# Patient Record
Sex: FEMALE | URBAN - METROPOLITAN AREA
[De-identification: names, ages, dates, MRNs, and addresses within clinical notes are randomized per-mention and may not be internally consistent; named-entity substitution may affect disease eponyms.]

---

## 2022-09-22 PROBLEM — Z00.00 ENCOUNTER FOR PREVENTIVE HEALTH EXAMINATION: Status: ACTIVE | Noted: 2022-09-22

## 2022-09-27 ENCOUNTER — EMERGENCY (EMERGENCY)
Facility: HOSPITAL | Age: 87
LOS: 0 days | Discharge: HOME | End: 2022-09-27
Attending: STUDENT IN AN ORGANIZED HEALTH CARE EDUCATION/TRAINING PROGRAM

## 2022-09-27 VITALS — TEMPERATURE: 98 F | WEIGHT: 123.9 LBS

## 2022-09-27 DIAGNOSIS — Z20.822 CONTACT WITH AND (SUSPECTED) EXPOSURE TO COVID-19: ICD-10-CM

## 2022-09-27 DIAGNOSIS — H91.90 UNSPECIFIED HEARING LOSS, UNSPECIFIED EAR: ICD-10-CM

## 2022-09-27 DIAGNOSIS — I46.9 CARDIAC ARREST, CAUSE UNSPECIFIED: ICD-10-CM

## 2022-09-27 DIAGNOSIS — I10 ESSENTIAL (PRIMARY) HYPERTENSION: ICD-10-CM

## 2022-09-27 DIAGNOSIS — M19.90 UNSPECIFIED OSTEOARTHRITIS, UNSPECIFIED SITE: ICD-10-CM

## 2022-09-27 DIAGNOSIS — Z66 DO NOT RESUSCITATE: ICD-10-CM

## 2022-09-27 DIAGNOSIS — F32.A DEPRESSION, UNSPECIFIED: ICD-10-CM

## 2022-09-27 LAB
FLUAV AG NPH QL: SIGNIFICANT CHANGE UP
FLUBV AG NPH QL: SIGNIFICANT CHANGE UP
SARS-COV-2 RNA SPEC QL NAA+PROBE: SIGNIFICANT CHANGE UP

## 2022-09-27 PROCEDURE — 99284 EMERGENCY DEPT VISIT MOD MDM: CPT

## 2022-09-27 NOTE — ED ADULT NURSE NOTE - OBJECTIVE STATEMENT
pt biba from [facility], for difficulty breathing, DNR resent in chart, on arrival pt asystole, apneic. brought to bed 4. bloody vomitus noted in mouth md to bedside, time of death pronounced 0949 pt marco a from [facility], for difficulty breathing, DNR resent in chart, on arrival pt asystole, apneic. brought to bed 4. bloody vomitus noted in mouth md to bedside, time of death pronounced 0945. [place] [employer] [place] contacted.

## 2022-09-27 NOTE — ED ADULT TRIAGE NOTE - CHIEF COMPLAINT QUOTE
pt biba from [facility], for difficulty breathing, DNR resent in chart, on arrival pt asystole, apneic. brought to bed 4. bloody vomitus noted in mouth md to bedside, time of death pronounced 0981

## 2022-09-27 NOTE — ED PROVIDER NOTE - CLINICAL SUMMARY MEDICAL DECISION MAKING FREE TEXT BOX
89 y/o F w/ PMH as above presenting in cardiac arrest. Pt w/ no pulse or spontaneous respirations upon arrival to ED. EMS presented signed MOLST form so resuscitation was not performed. 89 y/o F w/ PMH as above presenting in cardiac arrest. Pt w/ no pulse or spontaneous respirations upon arrival to ED. EMS presented signed MOLST form so resuscitation was not performed. EMS reports family on way to hospital, will inform them once they arrive. Covid test ordered as requested by nursing staff. TOD 0911.

## 2022-09-27 NOTE — ED PROVIDER NOTE - PROGRESS NOTE DETAILS
Attending Hernán: pt's daughter Ramya and son-in-law arrived to ED. Informed them of pt's death, condolences offered. Spoke w/ ME, not accepted. Family to arrange w/  home.

## 2022-09-27 NOTE — ED ADULT NURSE NOTE - CHIEF COMPLAINT QUOTE
pt biba from [facility], for difficulty breathing, DNR resent in chart, on arrival pt asystole, apneic. brought to bed 4. bloody vomitus noted in mouth md to bedside, time of death pronounced 0932

## 2022-09-27 NOTE — ED PROVIDER NOTE - PHYSICAL EXAMINATION
Gen: frail, chronically ill appearing:  Head: NCAT  Eyes: pupils mid dilated non reactive b/l  Cardiac: no pulse  Resp: no spontaneous respirations  Neuro: unconscious

## 2022-09-27 NOTE — ED PROVIDER NOTE - OBJECTIVE STATEMENT
89 y/o F w/ PMH of HTN, depression, hard of hearing, hypokalemia, arthritis presenting w/ cardiac arrest. BIBEMS from nursing home. EMS reports nursing home called for them for pt experiencing SOB. Pt reportedly in usual state of health earlier this morning. Had developed respiratory distress and found to be hypotensive to 60s systolic. Also reportedly hypokalemic. Pt started vomited what appeared to be bright red blood. Upon EMS arrival to hospital, pt lost pulses while crew was pulling into hospital. Pt is DNR/DNI per provided MOLST form.

## 2022-09-27 NOTE — ED ADULT NURSE NOTE - NSIMPLEMENTINTERV_GEN_ALL_ED
Implemented All Fall with Harm Risk Interventions:  [place] to call system. Call bell, personal items and telephone within reach. Instruct patient to call for assistance. Room bathroom lighting operational. Non-slip footwear when patient is off stretcher. Physically safe environment: no spills, clutter or unnecessary equipment. Stretcher in lowest position, wheels locked, appropriate side rails in place. Provide visual cue, wrist band, yellow gown, etc. Monitor gait and stability. Monitor for mental status changes and reorient to person, place, and time. Review medications for side effects contributing to fall risk. Reinforce activity limits and safety measures with patient and family. Provide visual clues: red socks.
